# Patient Record
Sex: FEMALE | Race: BLACK OR AFRICAN AMERICAN
[De-identification: names, ages, dates, MRNs, and addresses within clinical notes are randomized per-mention and may not be internally consistent; named-entity substitution may affect disease eponyms.]

---

## 2017-10-29 ENCOUNTER — HOSPITAL ENCOUNTER (EMERGENCY)
Dept: HOSPITAL 74 - FER | Age: 27
Discharge: HOME | End: 2017-10-29
Payer: SELF-PAY

## 2017-10-29 VITALS — BODY MASS INDEX: 22.6 KG/M2

## 2017-10-29 VITALS — SYSTOLIC BLOOD PRESSURE: 114 MMHG | DIASTOLIC BLOOD PRESSURE: 61 MMHG | HEART RATE: 89 BPM | TEMPERATURE: 98.3 F

## 2017-10-29 DIAGNOSIS — Z04.3: Primary | ICD-10-CM

## 2017-10-29 DIAGNOSIS — F17.210: ICD-10-CM
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
## 2017-10-29 DIAGNOSIS — D64.9: ICD-10-CM
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
## 2017-10-29 LAB
ALBUMIN SERPL-MCNC: 4 G/DL (ref 3.5–5)
ALP SERPL-CCNC: 48 U/L (ref 32–92)
ALT SERPL-CCNC: 14 U/L (ref 10–40)
ANION GAP SERPL CALC-SCNC: 4 MMOL/L (ref 8–16)
AST SERPL-CCNC: 32 U/L (ref 10–42)
BACTERIA #/AREA URNS HPF: (no result) /HPF
BASOPHILS # BLD: 3.6 % (ref 0–2)
BILIRUB SERPL-MCNC: 0.6 MG/DL (ref 0.2–1)
CALCIUM SERPL-MCNC: 9 MG/DL (ref 8.4–10.2)
CO2 SERPL-SCNC: 26 MMOL/L (ref 22–28)
COLOR UR: YELLOW
CREAT SERPL-MCNC: 0.8 MG/DL (ref 0.6–1.3)
DEPRECATED RDW RBC AUTO: 13.3 % (ref 11.6–15.6)
EOSINOPHIL # BLD: 1.6 % (ref 0–4.5)
GLUCOSE SERPL-MCNC: 88 MG/DL (ref 74–106)
MAGNESIUM SERPL-MCNC: 2 MG/DL (ref 1.8–2.4)
MCH RBC QN AUTO: 28.8 PG (ref 25.7–33.7)
MCHC RBC AUTO-ENTMCNC: 33.3 G/DL (ref 32–36)
MCV RBC: 86.5 FL (ref 80–96)
NEUTROPHILS # BLD: 56.2 % (ref 42.8–82.8)
PH UR: 7.5 [PH] (ref 4.5–8)
PLATELET # BLD AUTO: 206 K/MM3 (ref 134–434)
PMV BLD: 10.8 FL (ref 7.5–11.1)
PROT SERPL-MCNC: 7.4 G/DL (ref 6.4–8.3)
RBC # BLD AUTO: (no result) /HPF (ref 0–3)
SP GR UR: 1.01 (ref 1–1.02)
TROPONIN I SERPL-MCNC: 0 NG/ML (ref 0–0.05)
UROBILINOGEN UR STRIP-MCNC: 1 MG/DL (ref 0.2–1)
WBC # BLD AUTO: 7.3 K/MM3 (ref 4–10.8)
WBC # UR AUTO: (no result) /UL (ref 3–5)
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
## 2017-10-30 LAB
HYALINE CASTS URNS QL MICRO: (no result) /LPF
RBC CASTS URNS QL MICRO: (no result)

## 2018-01-10 ENCOUNTER — HOSPITAL ENCOUNTER (EMERGENCY)
Dept: HOSPITAL 74 - JER | Age: 28
Discharge: HOME | End: 2018-01-10
Payer: COMMERCIAL

## 2018-01-10 VITALS — DIASTOLIC BLOOD PRESSURE: 78 MMHG | HEART RATE: 98 BPM | SYSTOLIC BLOOD PRESSURE: 118 MMHG

## 2018-01-10 VITALS — TEMPERATURE: 98 F

## 2018-01-10 VITALS — BODY MASS INDEX: 21.9 KG/M2

## 2018-01-10 DIAGNOSIS — F17.210: ICD-10-CM

## 2018-01-10 DIAGNOSIS — G43.909: Primary | ICD-10-CM

## 2018-01-10 PROCEDURE — 3E033GC INTRODUCTION OF OTHER THERAPEUTIC SUBSTANCE INTO PERIPHERAL VEIN, PERCUTANEOUS APPROACH: ICD-10-PCS

## 2018-01-10 PROCEDURE — 3E0337Z INTRODUCTION OF ELECTROLYTIC AND WATER BALANCE SUBSTANCE INTO PERIPHERAL VEIN, PERCUTANEOUS APPROACH: ICD-10-PCS

## 2018-01-10 PROCEDURE — 3E0333Z INTRODUCTION OF ANTI-INFLAMMATORY INTO PERIPHERAL VEIN, PERCUTANEOUS APPROACH: ICD-10-PCS

## 2018-01-10 NOTE — PDOC
*Physical Exam





- Vital Signs


 Last Vital Signs











Temp Pulse Resp BP Pulse Ox


 


 98.0 F   109 H  14   114/71   98 


 


 01/10/18 05:02  01/10/18 05:02  01/10/18 05:02  01/10/18 05:02  01/10/18 05:02














- Physical Exam


General Appearance: Yes: Appropriately Dressed.  No: Apparent Distress


HEENT: positive: [place], Normal ENT Inspection, Normal Voice, Symmetrical, TMs 

Normal, Pharynx Normal


Neck: positive: Trachea midline.  negative: Tender, Tender lateral, Tender 

midline


Respiratory/Chest: positive: Lungs Clear, Normal Breath Sounds.  negative: 

Respiratory Distress, Accessory Muscle Use


Cardiovascular: positive: Regular Rhythm, Tachycardia


Gastrointestinal/Abdominal: positive: Normal Bowel Sounds, Soft.  negative: 

Tender


Lymphatic: negative: Adenopathy


Integumentary: positive: Normal Color, Dry


Neurologic: positive: CNs II-XII NML intact, Fully Oriented, Alert, Normal Mood/

Affect, Normal Response, Motor Strength 5/5





ED Treatment Course





- ADDITIONAL ORDERS


Additional order review: 


 Laboratory  Results











  01/10/18





  06:18


 


Urine HCG, Qual  Negative














- Medications


Given in the ED: 


ED Medications














Discontinued Medications














Generic Name Dose Route Start Last Admin





  Trade Name Freq  PRN Reason Stop Dose Admin


 


Diphenhydramine HCl  25 mg  01/10/18 06:17  01/10/18 06:51





  Benadryl Injection -  IVPB  01/10/18 06:18  25 mg





  ONCE ONE   Administration


 


Sodium Chloride  1,000 mls @ 1,000 mls/hr  01/10/18 06:16  01/10/18 06:51





  Normal Saline -  IV  01/10/18 07:15  1,000 mls/hr





  ASDIR STA   Administration


 


Metoclopramide HCl  10 mg  01/10/18 06:16  01/10/18 06:51





  Reglan Injection -  IVPUSH  01/10/18 06:17  10 mg





  ONCE ONE   Administration














Medical Decision Making





- Medical Decision Making





01/10/18 07:54


I have received report from [MATTHEW Benítez NP] regarding this patient. 





Pt's initial chief complaint: [Migraine, ran out of medication]





Pt's work up completed prior to sign out:  [ Labs, UA, Reglan, urine pregnancy]





Pt treatment given from prior staff:  [Reglan, IV fluids]





Pt plan to be completed:  [Waiting urine pregnancy consider Toradol


 Laboratory Results - last 24 hr











  01/10/18





  06:18


 


Urine HCG, Qual  Negative








]





Dispo: [ Pending]





01/10/18 10:09


She states relief after medication will DC patient home to follow-up with 

 in the next 24 hours.  Increase fluids, I discussed the physical exam 

findings, ancillary test results and final diagnoses with the patient. I 

answered all of the patient's questions.


The patient was satisfied with the care received and felt comfortable with the 

discharge plan and treatment plan. 


The patient will call  to arrange follow-up and will return to the Emergency 

Department with any new, persistent or worsening symptoms. 





*DC/Admit/Observation/Transfer


Diagnosis at time of Disposition: 


Migraine


Qualifiers:


 Migraine type: unspecified Status migrainosus presence: without status 

migrainosus Intractability: not intractable Qualified Code(s): G43.909 - 

Migraine, unspecified, not intractable, without status migrainosus








- Discharge Dispostion


Disposition: HOME


Condition at time of disposition: Good


Admit: No





- Referrals


Referrals: 


Jatinder Levine MD [Primary Care Provider] -  (Please see doctor tomorrow 1/11 

at 4:15 pm)





- Patient Instructions


Printed Discharge Instructions:  Migraine Headaches (Alternative Therapy), DI 

for Migraine


Additional Instructions: 


I have made an appointment for you to see your doctor tomorrow at 4:15 pm 

please take Motrin tonight as needed for pain.





- Post Discharge Activity

## 2018-01-10 NOTE — PDOC
History of Present Illness





- General


Chief Complaint: Headache


Stated Complaint: HEADACHE


Time Seen by Provider: 01/10/18 06:10


History Source: Patient





- History of Present Illness


Initial Comments: 





01/10/18 06:17


27 year old female c/o frontal headache x1 day with associated nausea and 

photophobia. patient has a history of migraines. " I ran out of my medication." 

denies head injury or trauma, denies weakness, dizziness. headache is similar 

to her usual migraines





Past History





- Past Medical History


Allergies/Adverse Reactions: 


 Allergies











Allergy/AdvReac Type Severity Reaction Status Date / Time


 


No Known Drug Allergies Allergy   Verified 01/10/18 05:02











Home Medications: 


Ambulatory Orders





Amox-Tr/K Cl [Augmentin - 875Mg Tablet] 1 tab PO BID #14 tablet 10/29/17 


Naproxen [Naprosyn -] 500 mg PO BID PRN #28 tablet 10/29/17 








Anemia: Yes


Asthma: No


Cancer: No


Cardiac Disorders: No


Diabetes: No


Seizures: No





- Reproductive History


Therapeutic (s) & number: Yes





- Immunization History


Td Vaccination: No (UNKNOWN)


Immunization Up to Date: Yes





- Suicide/Smoking/Psychosocial Hx


Smoking Status: Yes


Smoking History: Current every day smoker


Have you smoked in the past 12 months: No


Number of Cigarettes Smoked Daily: 2


Information on smoking cessation initiated: No


'Breaking Loose' booklet given: 10/29/17


Hx Alcohol Use: No


Drug/Substance Use Hx: No


Substance Use Type: Alcohol


Hx Substance Use Treatment: No





**Review of Systems





- Review of Systems


Able to Perform ROS?: Yes


Is the patient limited English proficient: No


Constitutional: No: Symptoms Reported, See HPI, Chills, Diaphoresis, Fever, 

Loss of Appetite, Malaise, Night Sweats, Weakness, Weight Stable, Unintentional 

Wgt. Loss, Unexplained wgt Loss, Other


HEENTM: Yes: Other (photophobia).  No: Symptoms Reported, See HPI, Eye Pain, 

Blurred Vision, Tearing, Recent change in vision, Double Vision, Cataracts, Ear 

Pain, Ocular Prothesis, Ear Discharge, Nose Pain, Nose Congestion, Tinnitus, 

Nose Bleeding, Hearing Loss, Throat Pain, Throat Swelling, Mouth Pain, Dental 

Problems, Difficulty Swallowing, Mouth Swelling


ABD/GI: Yes: Nausea.  No: Symptoms Reported, See HPI, Abdominal Distended, Abd. 

Pain w/ defecation, Blood Streaked Bowels, Constipated, Diarrhea, Difficulty 

Swallowing, Poor Appetite, Poor Fluid Intake, Rectal Bleeding, Vomiting, 

Indigestion, Abdominal cramping, Tarry Stools, Other


Neurological: Yes: Headache.  No: Symptoms reported, See HPI, Numbness, 

Paresthesia, Pre-Existing Deficit, Seizure, Tingling, Tremors, Weakness, 

Unsteady Gait, Ataxia, Dizziness, Other





*Physical Exam





- Vital Signs


 Last Vital Signs











Temp Pulse Resp BP Pulse Ox


 


 98.0 F   109 H  14   114/71   98 


 


 01/10/18 05:02  01/10/18 05:02  01/10/18 05:02  01/10/18 05:02  01/10/18 05:02














- Physical Exam


General Appearance: Yes: Appropriately Dressed


HEENT: positive: Other (PERRLA)


Respiratory/Chest: positive: Lungs Clear, Normal Breath Sounds


Neurologic: positive: CNs II-XII NML intact, Fully Oriented, Alert, Normal Mood/

Affect, Normal Response, Motor Strength 5/5





Progress Note





- Progress Note


Progress Note: 





A: migraines





P: IVF





Reglan





benadryl





urine pregnancy. 





consider toradol once urine pregnancy is back. 





*DC/Admit/Observation/Transfer


Diagnosis at time of Disposition: 


Migraine


Qualifiers:


 Migraine type: unspecified Status migrainosus presence: without status 

migrainosus Intractability: not intractable Qualified Code(s): G43.909 - 

Migraine, unspecified, not intractable, without status migrainosus








- Referrals


Referrals: 


Jatinder Levine MD [Primary Care Provider] - 





- Patient Instructions





- Post Discharge Activity

## 2018-01-10 NOTE — PDOC
*Physical Exam





- Vital Signs


 Last Vital Signs











Temp Pulse Resp BP Pulse Ox


 


 98.0 F   109 H  14   114/71   98 


 


 01/10/18 05:02  01/10/18 05:02  01/10/18 05:02  01/10/18 05:02  01/10/18 05:02














- Physical Exam


Comments: 





01/10/18 08:07


The patient was examined by [SABAS Benítez/SABAS Patel] under my direct 

supervision. I personally evaluated the patient. I concur with the above 

findings and the plan of care.  Patient is a 27-year-old female who presents 

with atypical migraine. I do not suspect subarachnoid hemorrhage at this time. 

Will hydrate, we'll administer Benadryl and Reglan. Will reassess.











ED Treatment Course





- ADDITIONAL ORDERS


Additional order review: 


 Laboratory  Results











  01/10/18





  06:18


 


Urine HCG, Qual  Negative














- Medications


Given in the ED: 


ED Medications














Discontinued Medications














Generic Name Dose Route Start Last Admin





  Trade Name Jesse  PRN Reason Stop Dose Admin


 


Diphenhydramine HCl  25 mg  01/10/18 06:17  01/10/18 06:51





  Benadryl Injection -  IVPB  01/10/18 06:18  25 mg





  ONCE ONE   Administration


 


Sodium Chloride  1,000 mls @ 1,000 mls/hr  01/10/18 06:16  01/10/18 06:51





  Normal Saline -  IV  01/10/18 07:15  1,000 mls/hr





  ASDIR STA   Administration


 


Metoclopramide HCl  10 mg  01/10/18 06:16  01/10/18 06:51





  Reglan Injection -  IVPUSH  01/10/18 06:17  10 mg





  ONCE ONE   Administration














*DC/Admit/Observation/Transfer


Diagnosis at time of Disposition: 


Migraine


Qualifiers:


 Migraine type: unspecified Status migrainosus presence: without status 

migrainosus Intractability: not intractable Qualified Code(s): G43.909 - 

Migraine, unspecified, not intractable, without status migrainosus








- Referrals


Referrals: 


Jatinder Levine MD [Primary Care Provider] - 





- Patient Instructions





- Post Discharge Activity